# Patient Record
Sex: MALE | Race: BLACK OR AFRICAN AMERICAN | NOT HISPANIC OR LATINO | Employment: UNEMPLOYED | URBAN - METROPOLITAN AREA
[De-identification: names, ages, dates, MRNs, and addresses within clinical notes are randomized per-mention and may not be internally consistent; named-entity substitution may affect disease eponyms.]

---

## 2022-04-26 ENCOUNTER — TELEPHONE (OUTPATIENT)
Dept: PRIMARY CARE CLINIC | Facility: CLINIC | Age: 37
End: 2022-04-26
Payer: MEDICAID

## 2022-04-26 NOTE — TELEPHONE ENCOUNTER
The patient did not know what this was about and asked about a procedure. He did hang up on me.       ----- Message from Sowmya Newton sent at 4/26/2022 11:17 AM CDT -----  Regarding: nurse  Sent to the wrong staff box    ----- Message -----  From: Raina Gagnon  Sent: 4/26/2022  10:11 AM CDT  To: [facility] Staff    Pt would like the nurse to call him back. Call back number is [phone]. Thx. EL